# Patient Record
Sex: MALE | URBAN - METROPOLITAN AREA
[De-identification: names, ages, dates, MRNs, and addresses within clinical notes are randomized per-mention and may not be internally consistent; named-entity substitution may affect disease eponyms.]

---

## 2018-09-26 ENCOUNTER — PROCEDURE VISIT (OUTPATIENT)
Dept: SPORTS MEDICINE | Age: 23
End: 2018-09-26

## 2018-09-26 DIAGNOSIS — M75.22 BICEPS TENDINITIS OF LEFT SHOULDER: Primary | ICD-10-CM

## 2018-09-26 ASSESSMENT — PAIN SCALES - GENERAL: PAINLEVEL_OUTOF10: 6

## 2018-10-09 ENCOUNTER — PROCEDURE VISIT (OUTPATIENT)
Dept: SPORTS MEDICINE | Age: 23
End: 2018-10-09

## 2018-10-09 DIAGNOSIS — M75.22 BICEPS TENDINITIS OF LEFT SHOULDER: Primary | ICD-10-CM

## 2018-10-16 ENCOUNTER — PROCEDURE VISIT (OUTPATIENT)
Dept: SPORTS MEDICINE | Age: 23
End: 2018-10-16

## 2018-10-16 DIAGNOSIS — M75.22 BICEPS TENDINITIS OF LEFT SHOULDER: Primary | ICD-10-CM

## 2018-10-17 ENCOUNTER — PROCEDURE VISIT (OUTPATIENT)
Dept: SPORTS MEDICINE | Age: 23
End: 2018-10-17

## 2018-10-17 DIAGNOSIS — M75.22 BICEPS TENDINITIS OF LEFT SHOULDER: Primary | ICD-10-CM

## 2018-10-22 ENCOUNTER — PROCEDURE VISIT (OUTPATIENT)
Dept: SPORTS MEDICINE | Age: 23
End: 2018-10-22

## 2018-10-22 DIAGNOSIS — S86.311A STRAIN OF PERONEAL TENDON OF RIGHT FOOT, INITIAL ENCOUNTER: Primary | ICD-10-CM

## 2018-10-22 ASSESSMENT — PAIN SCALES - GENERAL: PAINLEVEL_OUTOF10: 4

## 2018-10-31 ENCOUNTER — PROCEDURE VISIT (OUTPATIENT)
Dept: SPORTS MEDICINE | Age: 23
End: 2018-10-31

## 2018-10-31 DIAGNOSIS — S86.311A STRAIN OF PERONEAL TENDON OF RIGHT FOOT, INITIAL ENCOUNTER: ICD-10-CM

## 2018-10-31 DIAGNOSIS — M75.22 BICEPS TENDINITIS OF LEFT SHOULDER: Primary | ICD-10-CM

## 2018-11-13 ENCOUNTER — PROCEDURE VISIT (OUTPATIENT)
Dept: SPORTS MEDICINE | Age: 23
End: 2018-11-13

## 2018-11-13 DIAGNOSIS — M75.22 BICEPS TENDINITIS OF LEFT SHOULDER: Primary | ICD-10-CM

## 2018-11-15 ENCOUNTER — PROCEDURE VISIT (OUTPATIENT)
Dept: SPORTS MEDICINE | Age: 23
End: 2018-11-15

## 2018-11-15 DIAGNOSIS — M75.22 BICEPS TENDINITIS OF LEFT SHOULDER: Primary | ICD-10-CM

## 2018-11-30 ENCOUNTER — PROCEDURE VISIT (OUTPATIENT)
Dept: SPORTS MEDICINE | Age: 23
End: 2018-11-30

## 2018-11-30 DIAGNOSIS — M75.22 BICEPS TENDINITIS OF LEFT SHOULDER: Primary | ICD-10-CM

## 2023-02-17 ENCOUNTER — NURSE TRIAGE (OUTPATIENT)
Dept: OTHER | Facility: CLINIC | Age: 28
End: 2023-02-17

## 2023-02-17 NOTE — TELEPHONE ENCOUNTER
LM on VM - Made pt aware our office is not accepting new patients at this time, offered a few other offices to call.

## 2023-02-17 NOTE — TELEPHONE ENCOUNTER
Location of patient: Izabela Angela call from Sridhar at [employer] with [employer]. 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 Patient/Caller agrees with recommended disposition; writer provided warm transfer to [employer] [facility] [employer] for appointment scheduling    Attention Provider: Thank you for allowing me to participate in the care of your patient. The patient was connected to triage in response to information provided to the ECC/PSC. Please do not respond through this encounter as the response is not directed to a shared pool.      Reason for Disposition   Numbness or tingling in one or both hands is a chronic symptom (recurrent or ongoing problem lasting > 4 weeks)    Protocols used: Neurologic Deficit-ADULT-[place]

## 2023-02-18 ENCOUNTER — APPOINTMENT (OUTPATIENT)
Dept: GENERAL RADIOLOGY | Age: 28
End: 2023-02-18
Payer: COMMERCIAL

## 2023-02-18 ENCOUNTER — HOSPITAL ENCOUNTER (EMERGENCY)
Age: 28
Discharge: HOME | End: 2023-02-18
Payer: COMMERCIAL

## 2023-02-18 VITALS
HEART RATE: 87 BPM | OXYGEN SATURATION: 100 % | RESPIRATION RATE: 16 BRPM | SYSTOLIC BLOOD PRESSURE: 128 MMHG | BODY MASS INDEX: 26.48 KG/M2 | DIASTOLIC BLOOD PRESSURE: 75 MMHG | WEIGHT: 185 LBS | TEMPERATURE: 97.9 F | HEIGHT: 70 IN

## 2023-02-18 DIAGNOSIS — R09.82 POST-NASAL DRAINAGE: ICD-10-CM

## 2023-02-18 DIAGNOSIS — R06.02 SHORTNESS OF BREATH: Primary | ICD-10-CM

## 2023-02-18 DIAGNOSIS — J02.9 ACUTE PHARYNGITIS, UNSPECIFIED ETIOLOGY: ICD-10-CM

## 2023-02-18 LAB
ALBUMIN SERPL-MCNC: 4.8 G/DL (ref 3.5–5.2)
ALP BLD-CCNC: 87 U/L (ref 40–129)
ALT SERPL-CCNC: 31 U/L (ref 0–40)
ANION GAP SERPL CALCULATED.3IONS-SCNC: 12 MMOL/L (ref 7–16)
AST SERPL-CCNC: 158 U/L (ref 0–39)
BASOPHILS ABSOLUTE: 0.03 E9/L (ref 0–0.2)
BASOPHILS RELATIVE PERCENT: 0.4 % (ref 0–2)
BILIRUB SERPL-MCNC: 0.4 MG/DL (ref 0–1.2)
BUN BLDV-MCNC: 22 MG/DL (ref 6–20)
CALCIUM SERPL-MCNC: 9.4 MG/DL (ref 8.6–10.2)
CHLORIDE BLD-SCNC: 102 MMOL/L (ref 98–107)
CO2: 26 MMOL/L (ref 22–29)
CREAT SERPL-MCNC: 1 MG/DL (ref 0.7–1.2)
EOSINOPHILS ABSOLUTE: 0.13 E9/L (ref 0.05–0.5)
EOSINOPHILS RELATIVE PERCENT: 1.7 % (ref 0–6)
GFR SERPL CREATININE-BSD FRML MDRD: >60 ML/MIN/1.73
GLUCOSE BLD-MCNC: 97 MG/DL (ref 74–99)
HCT VFR BLD CALC: 42.1 % (ref 37–54)
HEMOGLOBIN: 14.4 G/DL (ref 12.5–16.5)
IMMATURE GRANULOCYTES #: 0.01 E9/L
IMMATURE GRANULOCYTES %: 0.1 % (ref 0–5)
INFLUENZA A BY PCR: NOT DETECTED
INFLUENZA B BY PCR: NOT DETECTED
LYMPHOCYTES ABSOLUTE: 2.35 E9/L (ref 1.5–4)
LYMPHOCYTES RELATIVE PERCENT: 30.7 % (ref 20–42)
MCH RBC QN AUTO: 30.1 PG (ref 26–35)
MCHC RBC AUTO-ENTMCNC: 34.2 % (ref 32–34.5)
MCV RBC AUTO: 87.9 FL (ref 80–99.9)
MONOCYTES ABSOLUTE: 0.76 E9/L (ref 0.1–0.95)
MONOCYTES RELATIVE PERCENT: 9.9 % (ref 2–12)
NEUTROPHILS ABSOLUTE: 4.37 E9/L (ref 1.8–7.3)
NEUTROPHILS RELATIVE PERCENT: 57.2 % (ref 43–80)
PDW BLD-RTO: 12.8 FL (ref 11.5–15)
PLATELET # BLD: 256 E9/L (ref 130–450)
PMV BLD AUTO: 9.5 FL (ref 7–12)
POTASSIUM REFLEX MAGNESIUM: 4.1 MMOL/L (ref 3.5–5)
PRO-BNP: <5 PG/ML (ref 0–125)
RBC # BLD: 4.79 E12/L (ref 3.8–5.8)
SODIUM BLD-SCNC: 140 MMOL/L (ref 132–146)
TOTAL PROTEIN: 8 G/DL (ref 6.4–8.3)
TROPONIN, HIGH SENSITIVITY: 7 NG/L (ref 0–11)
WBC # BLD: 7.7 E9/L (ref 4.5–11.5)

## 2023-02-18 PROCEDURE — 93005 ELECTROCARDIOGRAM TRACING: CPT | Performed by: PHYSICIAN ASSISTANT

## 2023-02-18 PROCEDURE — 84484 ASSAY OF TROPONIN QUANT: CPT

## 2023-02-18 PROCEDURE — 85025 COMPLETE CBC W/AUTO DIFF WBC: CPT

## 2023-02-18 PROCEDURE — 99285 EMERGENCY DEPT VISIT HI MDM: CPT

## 2023-02-18 PROCEDURE — 83880 ASSAY OF NATRIURETIC PEPTIDE: CPT

## 2023-02-18 PROCEDURE — 71046 X-RAY EXAM CHEST 2 VIEWS: CPT

## 2023-02-18 PROCEDURE — 80053 COMPREHEN METABOLIC PANEL: CPT

## 2023-02-18 PROCEDURE — 87502 INFLUENZA DNA AMP PROBE: CPT

## 2023-02-18 RX ORDER — ALBUTEROL SULFATE 90 UG/1
2 AEROSOL, METERED RESPIRATORY (INHALATION) EVERY 6 HOURS PRN
Qty: 18 G | Refills: 0 | Status: SHIPPED | OUTPATIENT
Start: 2023-02-18

## 2023-02-18 RX ORDER — CETIRIZINE HYDROCHLORIDE 10 MG/1
10 TABLET ORAL DAILY
Qty: 30 TABLET | Refills: 0 | Status: SHIPPED | OUTPATIENT
Start: 2023-02-18 | End: 2023-03-20

## 2023-02-18 ASSESSMENT — PAIN - FUNCTIONAL ASSESSMENT: PAIN_FUNCTIONAL_ASSESSMENT: NONE - DENIES PAIN

## 2023-02-18 NOTE — ED PROVIDER NOTES
Independent [facility] Visit. Department of Emergency Medicine   ED  Provider Note  Admit Date/Time: 2/18/2023  6:45 PM  ED Bed: 32/32     MRN: [id number]  CHIEF COMPLAINT:   Shortness of Breath (JONES, worse when sitting, 99% on RA / started yesterday) and Pharyngitis (Rapid strep done today at urgent care negative )       HISTORY OF PRESENT ILLNESS:      Yovanny Martinez is a 24 y.o. male who presents to the ED for shortness of breath that began yesterday. Patient states he gets dyspnea on exertion. He states his shortness of breath is also worse when he is sitting. He also reports sore throat. Patient was sent in by urgent care for further evaluation due to his complaints of shortness of breath and chest discomfort. Patient had negative COVID and negative strep test done at urgent care prior to arrival.  He denies any past history of heart or lung problems. He has no headache, dizziness, fever/chills, body aches, abdominal pain, nausea, vomiting, diarrhea, or recent travel. He is alert and oriented x3 and in no apparent distress at this exam.  Patient is nontoxic-appearing. He denies any known sick contacts. PCP: No primary care provider on file. PERC Rule for PE:    Age ? 50 negative     HR ? 100 negative     O2 Sat on Room Air < 95% negative     Prior History of DVT/PE negative     Recent Trauma or Surgery negative     Hemoptysis negative     Exogenous Estrogen/Hormone Use negative     Unilateral Extremity Swelling negative     REVIEW OF SYSTEMS:   Pertinent positives and negatives are stated within HPI, all other systems reviewed and are negative. Past Medical History: No past medical history on file. Past Surgical History: No past surgical history on file. Social History:      Family History: family history is not on file. Allergies: Patient has no known allergies.     PHYSICAL EXAM:     Vitals:    02/18/23 1749 02/18/23 1751   BP:  128/75   Pulse: 87    Resp: 16    Temp: 97.9 °F (36.6 °C) TempSrc: Oral    SpO2: 100%    Weight: 185 lb (83.9 kg)    Height: 5' 10\" (1.778 m)    Oxygen Saturation Interpretation: Normal    Physical Exam   Constitutional/General: Alert and oriented x4, well appearing, non toxic in NAD  HEENT:  NC/NT. EOMI, Airway patent, No pharyngeal erythema  Neck: Supple, full ROM, non tender with no meningeal signs, no lymphadenopathy   Respiratory: Lungs clear to auscultation bilaterally with good air flow, Not in respiratory distress, 100% room air   CV:  Regular rate. Regular rhythm. GI:  Abdomen soft, Non tender, Non distended. No rebound, guarding, or rigidity. No pulsatile masses. Musculoskeletal: Moves all extremities x 4. Warm and well perfused, no edema. Integument: Skin warm and dry. No rashes.    Neurologic: GCS 15, no focal deficits, symmetric strength 5/5 in the upper and lower extremities bilaterally, CN II-XII grossly intact    PROCEDURES:   None     LAB/IMAGING RESULTS:   (All laboratory and radiology results have been personally reviewed by myself)  Labs:  Results for orders placed or performed during the hospital encounter of 02/18/23   RAPID INFLUENZA A/B ANTIGENS    Specimen: Nasopharyngeal   Result Value Ref Range    Influenza A by PCR Not Detected Not Detected    Influenza B by PCR Not Detected Not Detected   CBC with Auto Differential   Result Value Ref Range    WBC 7.7 4.5 - 11.5 E9/L    RBC 4.79 3.80 - 5.80 E12/L    Hemoglobin 14.4 12.5 - 16.5 g/dL    Hematocrit 42.1 37.0 - 54.0 %    MCV 87.9 80.0 - 99.9 fL    MCH 30.1 26.0 - 35.0 pg    MCHC 34.2 32.0 - 34.5 %    RDW 12.8 11.5 - 15.0 fL    Platelets 367 973 - 376 E9/L    MPV 9.5 7.0 - 12.0 fL    Neutrophils % 57.2 43.0 - 80.0 %    Immature Granulocytes % 0.1 0.0 - 5.0 %    Lymphocytes % 30.7 20.0 - 42.0 %    Monocytes % 9.9 2.0 - 12.0 %    Eosinophils % 1.7 0.0 - 6.0 %    Basophils % 0.4 0.0 - 2.0 %    Neutrophils Absolute 4.37 1.80 - 7.30 E9/L    Immature Granulocytes # 0.01 E9/L    Lymphocytes Absolute 2. 35 1.50 - 4.00 E9/L    Monocytes Absolute 0.76 0.10 - 0.95 E9/L    Eosinophils Absolute 0.13 0.05 - 0.50 E9/L    Basophils Absolute 0.03 0.00 - 0.20 E9/L   Comprehensive Metabolic Panel w/ Reflex to MG   Result Value Ref Range    Sodium 140 132 - 146 mmol/L    Potassium reflex Magnesium 4.1 3.5 - 5.0 mmol/L    Chloride 102 98 - 107 mmol/L    CO2 26 22 - 29 mmol/L    Anion Gap 12 7 - 16 mmol/L    Glucose 97 74 - 99 mg/dL    BUN 22 (H) 6 - 20 mg/dL    Creatinine 1.0 0.7 - 1.2 mg/dL    Est, Glom Filt Rate >60 >=60 mL/min/1.73    Calcium 9.4 8.6 - 10.2 mg/dL    Total Protein 8.0 6.4 - 8.3 g/dL    Albumin 4.8 3.5 - 5.2 g/dL    Total Bilirubin 0.4 0.0 - 1.2 mg/dL    Alkaline Phosphatase 87 40 - 129 U/L    ALT 31 0 - 40 U/L     (H) 0 - 39 U/L   Troponin   Result Value Ref Range    Troponin, High Sensitivity 7 0 - 11 ng/L   Brain Natriuretic Peptide   Result Value Ref Range    Pro-BNP <5 0 - 125 pg/mL   EKG 12 Lead   Result Value Ref Range    Ventricular Rate 67 BPM    Atrial Rate 67 BPM    P-R Interval 152 ms    QRS Duration 94 ms    Q-T Interval 400 ms    QTc Calculation (Bazett) 422 ms    P Axis 50 degrees    R Axis 43 degrees    T Axis 25 degrees     Imaging: All Radiology results interpreted by Radiologist unless otherwise noted. XR CHEST (2 VW)   Final Result   No acute cardiopulmonary process. EKG #1:  Interpreted by emergency department physician unless otherwise noted. Time:  1910    Rate: 67  QT/QTc: 400/422  Rhythm: Sinus. Interpretation: no acute changes. Comparison: no previous EKG. Confirmed by ED attending     -- MEDICAL DECISION MAKING --   History From: History from : Patient  Limitations to history : None    Record Review:  Outpatient Notes reviewed  Other Records Reviewed :  Outpatient Notes Urgent Care    CC/HPI Summary, DDx, ED Course, and Reassessment:   Patient presents to the ED for Shortness of Breath (JONES, worse when sitting, 99% on RA / started yesterday) and Pharyngitis (Rapid strep done today at urgent care negative )    This is a 57-year-old male who presents with shortness of breath that began yesterday. He also reports sore throat. He had negative strep and COVID test done at urgent care prior to arrival.  He was sent in for further evaluation. X-ray reveals no effusions or pneumonia, influenza result negative  Patient will be sent home with albuterol inhaler to use as needed as well as antihistamine to help with postnasal drip. Patient states he has an appointment with his family doctor on Monday and advised to keep that for follow-up appointment    Patient was given the following medications:  Medications - No data to display     Differential diagnoses included but not limited to URI, asthma, bronchitis    Reassessment:  Patient continues to be non-toxic on re-evaluation. Chronic Conditions:   No past medical history on file. ED COURSE:      Any labs and imaging were reviewed by myself. Consultations:  None  Discussion with Other Profesionals : None    COUNSELING:   I have spoken with the patient/caregiver and discussed todays results, in addition to providing specific details for the plan of care and counseling regarding the diagnosis and prognosis and are agreeable with the plan. All results reviewed with pt and all questions answered. I discussed the differential, results and discharge plan with the patient and/or family/friend/caregiver if present. I emphasized the importance of follow-up with the physician I referred them to in the timeframe recommended. I explained reasons for the patient to return to the Emergency Department. Additional verbal discharge instructions were also given and discussed with the patient to supplement those generated by the EMR. We also discussed medications that were prescribed (if any) including common side effects and interactions. All questions were addressed.   They understand return precautions and discharge instructions. The patient and/or family/friend/caregiver expressed understanding. Vitals were stable and they were in no distress at discharge. Findings were discussed with the patient and reasons to immediately return to the ED were articulated to them. Patient will follow-up with their PMD    DISPOSITION CONSIDERATIONS:    Disposition Considerations:  (include Tests not done, Shared Decision Making, Pt Expectation of Test or Tx.):   Appropriate for outpatient management        Social Determinants:  Social Determinants : None    MEDICATIONS:   DISCHARGE MEDICATIONS:  Discharge Medication List as of 2/18/2023  9:50 PM        START taking these medications    Details   albuterol sulfate HFA (PROAIR HFA) 108 (90 Base) MCG/ACT inhaler Inhale 2 puffs into the lungs every 6 hours as needed for Shortness of Breath, Disp-18 g, R-0Normal      cetirizine (ZYRTEC) 10 MG tablet Take 1 tablet by mouth daily, Disp-30 tablet, R-0Normal           DISCONTINUED MEDICATIONS:  Discharge Medication List as of 2/18/2023  9:50 PM        DIAGNOSIS:     1. Shortness of breath    2. Acute pharyngitis, unspecified etiology    3. Post-nasal drainage      This patient's ED course included: a personal history and physicial examination  This patient has remained hemodynamically stable during their ED course. DISPOSITION:   Discharge to home. Patient condition is good. Discharge Instructions:   Patient referred to  [facility], [address] [address]  [phone]    Go to   for already scheduled appointment on Monday    Electronically signed by Kathya Herrera PA-C   DD: 2/18/23  I am the Primary Clinician of Record. **This report was transcribed using voice recognition software. Every effort was made to ensure accuracy; however, inadvertent computerized transcription errors may be present.     END OF PROVIDER NOTE       Kathya Herrera PA-C  02/18/23 0081

## 2023-02-19 LAB
EKG ATRIAL RATE: 67 BPM
EKG P AXIS: 50 DEGREES
EKG P-R INTERVAL: 152 MS
EKG Q-T INTERVAL: 400 MS
EKG QRS DURATION: 94 MS
EKG QTC CALCULATION (BAZETT): 422 MS
EKG R AXIS: 43 DEGREES
EKG T AXIS: 25 DEGREES
EKG VENTRICULAR RATE: 67 BPM

## 2023-02-19 PROCEDURE — 93010 ELECTROCARDIOGRAM REPORT: CPT | Performed by: INTERNAL MEDICINE

## 2023-03-03 ENCOUNTER — OFFICE VISIT (OUTPATIENT)
Dept: FAMILY MEDICINE CLINIC | Age: 28
End: 2023-03-03
Payer: COMMERCIAL

## 2023-03-03 VITALS
RESPIRATION RATE: 20 BRPM | HEART RATE: 60 BPM | BODY MASS INDEX: 27.86 KG/M2 | TEMPERATURE: 97.6 F | WEIGHT: 194.6 LBS | OXYGEN SATURATION: 97 % | HEIGHT: 70 IN | SYSTOLIC BLOOD PRESSURE: 118 MMHG | DIASTOLIC BLOOD PRESSURE: 69 MMHG

## 2023-03-03 DIAGNOSIS — R74.01 TRANSAMINITIS: ICD-10-CM

## 2023-03-03 DIAGNOSIS — R20.0 NUMBNESS: Primary | ICD-10-CM

## 2023-03-03 PROCEDURE — 99203 OFFICE O/P NEW LOW 30 MIN: CPT | Performed by: STUDENT IN AN ORGANIZED HEALTH CARE EDUCATION/TRAINING PROGRAM

## 2023-03-03 SDOH — SOCIAL STABILITY: SOCIAL INSECURITY: WITHIN THE LAST YEAR, HAVE YOU BEEN AFRAID OF YOUR PARTNER OR EX-PARTNER?: NO

## 2023-03-03 SDOH — HEALTH STABILITY: MENTAL HEALTH: HOW MANY STANDARD DRINKS CONTAINING ALCOHOL DO YOU HAVE ON A TYPICAL DAY?: 3 OR 4

## 2023-03-03 SDOH — HEALTH STABILITY: MENTAL HEALTH: HOW OFTEN DO YOU HAVE A DRINK CONTAINING ALCOHOL?: 2-4 TIMES A MONTH

## 2023-03-03 SDOH — SOCIAL STABILITY: SOCIAL NETWORK: HOW OFTEN DO YOU ATTEND CHURCH OR RELIGIOUS SERVICES?: NEVER

## 2023-03-03 SDOH — SOCIAL STABILITY: SOCIAL INSECURITY: WITHIN THE LAST YEAR, HAVE YOU BEEN HUMILIATED OR EMOTIONALLY ABUSED IN OTHER WAYS BY YOUR PARTNER OR EX-PARTNER?: NO

## 2023-03-03 SDOH — ECONOMIC STABILITY: FOOD INSECURITY: WITHIN THE PAST 12 MONTHS, THE FOOD YOU BOUGHT JUST DIDN'T LAST AND YOU DIDN'T HAVE MONEY TO GET MORE.: NEVER TRUE

## 2023-03-03 SDOH — SOCIAL STABILITY: SOCIAL NETWORK: HOW OFTEN DO YOU ATTENT MEETINGS OF THE CLUB OR ORGANIZATION YOU BELONG TO?: NEVER

## 2023-03-03 SDOH — SOCIAL STABILITY: SOCIAL NETWORK: ARE YOU MARRIED, WIDOWED, DIVORCED, SEPARATED, NEVER MARRIED, OR LIVING WITH A PARTNER?: NEVER MARRIED

## 2023-03-03 SDOH — HEALTH STABILITY: PHYSICAL HEALTH: ON AVERAGE, HOW MANY DAYS PER WEEK DO YOU ENGAGE IN MODERATE TO STRENUOUS EXERCISE (LIKE A BRISK WALK)?: 6 DAYS

## 2023-03-03 SDOH — ECONOMIC STABILITY: HOUSING INSECURITY: IN THE LAST 12 MONTHS, HOW MANY PLACES HAVE YOU LIVED?: 0

## 2023-03-03 SDOH — HEALTH STABILITY: PHYSICAL HEALTH: ON AVERAGE, HOW MANY MINUTES DO YOU ENGAGE IN EXERCISE AT THIS LEVEL?: 60 MIN

## 2023-03-03 SDOH — ECONOMIC STABILITY: TRANSPORTATION INSECURITY
IN THE PAST 12 MONTHS, HAS THE LACK OF TRANSPORTATION KEPT YOU FROM MEDICAL APPOINTMENTS OR FROM GETTING MEDICATIONS?: NO

## 2023-03-03 SDOH — SOCIAL STABILITY: SOCIAL NETWORK: HOW OFTEN DO YOU GET TOGETHER WITH FRIENDS OR RELATIVES?: ONCE A WEEK

## 2023-03-03 SDOH — ECONOMIC STABILITY: HOUSING INSECURITY
IN THE LAST 12 MONTHS, WAS THERE A TIME WHEN YOU DID NOT HAVE A STEADY PLACE TO SLEEP OR SLEPT IN A SHELTER (INCLUDING NOW)?: NO

## 2023-03-03 SDOH — ECONOMIC STABILITY: FOOD INSECURITY: WITHIN THE PAST 12 MONTHS, YOU WORRIED THAT YOUR FOOD WOULD RUN OUT BEFORE YOU GOT MONEY TO BUY MORE.: NEVER TRUE

## 2023-03-03 SDOH — ECONOMIC STABILITY: INCOME INSECURITY: IN THE LAST 12 MONTHS, WAS THERE A TIME WHEN YOU WERE NOT ABLE TO PAY THE MORTGAGE OR RENT ON TIME?: NO

## 2023-03-03 SDOH — ECONOMIC STABILITY: INCOME INSECURITY: HOW HARD IS IT FOR YOU TO PAY FOR THE VERY BASICS LIKE FOOD, HOUSING, MEDICAL CARE, AND HEATING?: NOT HARD AT ALL

## 2023-03-03 SDOH — SOCIAL STABILITY: SOCIAL NETWORK
DO YOU BELONG TO ANY CLUBS OR ORGANIZATIONS SUCH AS CHURCH GROUPS UNIONS, FRATERNAL OR ATHLETIC GROUPS, OR SCHOOL GROUPS?: NO

## 2023-03-03 SDOH — SOCIAL STABILITY: SOCIAL INSECURITY
WITHIN THE LAST YEAR, HAVE TO BEEN RAPED OR FORCED TO HAVE ANY KIND OF SEXUAL ACTIVITY BY YOUR PARTNER OR EX-PARTNER?: NO

## 2023-03-03 SDOH — SOCIAL STABILITY: SOCIAL INSECURITY
WITHIN THE LAST YEAR, HAVE YOU BEEN KICKED, HIT, SLAPPED, OR OTHERWISE PHYSICALLY HURT BY YOUR PARTNER OR EX-PARTNER?: NO

## 2023-03-03 SDOH — SOCIAL STABILITY: SOCIAL NETWORK
IN A TYPICAL WEEK, HOW MANY TIMES DO YOU TALK ON THE PHONE WITH FAMILY, FRIENDS, OR NEIGHBORS?: MORE THAN THREE TIMES A WEEK

## 2023-03-03 SDOH — HEALTH STABILITY: MENTAL HEALTH
STRESS IS WHEN SOMEONE FEELS TENSE, NERVOUS, ANXIOUS, OR CAN'T SLEEP AT NIGHT BECAUSE THEIR MIND IS TROUBLED. HOW STRESSED ARE YOU?: TO SOME EXTENT

## 2023-03-03 SDOH — ECONOMIC STABILITY: TRANSPORTATION INSECURITY
IN THE PAST 12 MONTHS, HAS LACK OF TRANSPORTATION KEPT YOU FROM MEETINGS, WORK, OR FROM GETTING THINGS NEEDED FOR DAILY LIVING?: NO

## 2023-03-03 ASSESSMENT — PATIENT HEALTH QUESTIONNAIRE - PHQ9
SUM OF ALL RESPONSES TO PHQ QUESTIONS 1-9: 2
SUM OF ALL RESPONSES TO PHQ QUESTIONS 1-9: 2
SUM OF ALL RESPONSES TO PHQ9 QUESTIONS 1 & 2: 2
1. LITTLE INTEREST OR PLEASURE IN DOING THINGS: 1
2. FEELING DOWN, DEPRESSED OR HOPELESS: 1
SUM OF ALL RESPONSES TO PHQ QUESTIONS 1-9: 2
SUM OF ALL RESPONSES TO PHQ QUESTIONS 1-9: 2

## 2023-03-03 NOTE — PROGRESS NOTES
Patient:  Lily Camargo 24 y.o. male     03/03/23      Chiefcomplaint:   Chief Complaint   Patient presents with    Establish Care     Problems and Overview     Patient Active Problem List    Diagnosis Date Noted    Transaminitis 03/03/2023     Priority: Medium    Numbness 03/03/2023     Priority: Medium        New pt    Concern today is numbness in ulnar distribution. Had episode of jaw pain and shortly after developed numbness in fifth and part of 4th digit. It has remained numb. Exercises with repetitive flexion of wrist caused wrist pain only on the left but didn't necessarily impact numbness, otherwise there are no specific things that make it worse  He does not have neck pain or elbow pain. He does box. Had labs for respiratory illness and showed elevated AST. Uncertain etiology. No previous comparison. Didn't need inhaler prior to that episode but does have seasonal allergies    Prevention:  Health Maintenance Due   Topic Date Due    COVID-19 Vaccine (1) Never done    Varicella vaccine (1 of 2 - 2-dose childhood series) Never done    HPV vaccine (1 - Male 2-dose series) Never done    Depression Screen  Never done    HIV screen  Never done    Hepatitis C screen  Never done    Flu vaccine (1) 08/01/2022      Meds prior:  Current Outpatient Medications   Medication Instructions    albuterol sulfate HFA (PROAIR HFA) 108 (90 Base) MCG/ACT inhaler 2 puffs, Inhalation, EVERY 6 HOURS PRN    cetirizine (ZYRTEC) 10 mg, Oral, DAILY    lisdexamfetamine (VYVANSE) 30 mg, Oral, EVERY MORNING      Physical Exam   Vitals: /69   Pulse 60   Temp 97.6 °F (36.4 °C) (Temporal)   Resp 20   Ht 5' 10\" (1.778 m)   Wt 194 lb 9.6 oz (88.3 kg)   SpO2 97%   BMI 27.92 kg/m²   General Appearance: Alert, oriented, no acute distress  HEENT: No scleral icterus. No visible discharge from eyes  Neck: Not rigid. No visible masses  Chest wall/Lung:resp unlabored  Heart: Reg rate  Extremities:  No edema.  Numbness ulnar distribution. No weakness or loss of dexterity  Skin: No rashes. No jaundice  Neuro: Alert and oriented        Psych: Appropriate mood and appropriate affect  Assessment and Plan   Check emg for ulnar neuropathy. Consider fu with hand surgeon depending on results.   Recheck labs for transaminitis. No abd pain.    Numbness  -     EMG; Future  Transaminitis  -     Comprehensive Metabolic Panel; Future    No follow-ups on file.    Brad Sampson,      This document may have been prepared at least partially through the use of voice recognition software. Although effort is taken to assure the accuracy of this document, it is possible that grammatical, syntax,  or spelling errors may occur.

## 2023-03-09 ENCOUNTER — PATIENT MESSAGE (OUTPATIENT)
Dept: FAMILY MEDICINE CLINIC | Age: 28
End: 2023-03-09

## 2023-03-10 NOTE — TELEPHONE ENCOUNTER
From: Nhan Kennedy  To: Dr. Brad Sampson  Sent: 3/9/2023 7:09 PM EST  Subject: New appointment for testing     Hello,    I was told I would be receiving a phone call to set up an appointment for the tests you want me to take. I haven’t received any yet. I wasn’t sure if I should have by now but wanted to bring it to your attention. Thank you!

## 2023-03-22 ENCOUNTER — PROCEDURE VISIT (OUTPATIENT)
Dept: PHYSICAL MEDICINE AND REHAB | Age: 28
End: 2023-03-22

## 2023-03-22 VITALS — BODY MASS INDEX: 26.48 KG/M2 | WEIGHT: 185 LBS | HEIGHT: 70 IN

## 2023-03-22 DIAGNOSIS — G56.20 CUBITAL TUNNEL SYNDROME, UNSPECIFIED LATERALITY: Primary | ICD-10-CM

## 2023-03-22 DIAGNOSIS — R20.0 NUMBNESS: ICD-10-CM

## 2023-03-22 NOTE — PROGRESS NOTES
Electrodiagnostic Laboratory  *Accredited by the [facility] with exemplary status  [address] [facility] [place]  Phone: [phone]  Fax: [phone]      Date of Examination: 03/22/23    Patient Name: Tasha Stallworth    An independent historian was not needed. Tasha Stallworth  is a 24y.o. year old male who was seen today regarding   Chief Complaint   Patient presents with    Extremity Pain     none    Numbness     Numbness/tingling in the left pinky and half of ring finger. 3 months of symp. No acute injury. Extremity Weakness     none   . The symptoms started after no particular injury. Reports he first noted symptoms after completing a wrist workout. The symptoms are intermittent. I have reviewed the referring provider's office note. There is not a family history of neuromuscular conditions. Physical Exam: General: The patient is in no apparent distress. MSK: There is no joint effusion, deformity, instability, swelling, erythema or warmth. AROM is full in the spine and extremities. +Tinel left elbow, negative left wrist. Spurling is negative. Neurologic:  Diminished light touch left fourth and fifth digit, otherwise, no focal sensorimotor deficit. Reflexes 2+ and symmetric. Gait is normal.    Impression:     1. Numbness        Plan:   EMG is indicated to evaluate the above diagnosis. 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 Recommend surgical consult regarding cubital tunnel given extent of axonal loss.  In interim can consider OT, elbow splint at hs, elbow pad during waking
prognosis for recovery of demyelinating lesions is good if the cause is alleviated. The prognosis for recovery of axonal lesions is poor and dependant on collateral sprouting and reinnervation. Electrodiagnosis: There is electrodiagnostic evidence of a median mononeuropathy. Location: left at the wrist.   Rossi Moose: [  ] Axonal   [ X ] Demyelinating  [  ] Mixed axonal and demyelinating     [ X ] Sensory [  ] Motor               [  ] Mixed sensorimotor     [  ] with active denervation       [ X ] without active denervation  Duration: Acute  Severity: mild  Prognosis: Good    Previous Study: There is not a prior study for comparison. Follow up EMG is recommended if no surgical intervention and symptoms persist in 6 months. Technologist: Shoaibrolando Colin  Physician:    Kaur Carlos D.O., P.T. Board Certified Physical Medicine and Rehabilitation  Board Certified Electrodiagnostic Medicine      Nerve conduction studies and electromyography were performed according to our laboratory policies and procedures which can be provided upon request. All abnormal values are identified in the table.  Laboratory normal values can also be provided upon request.       Cc: DO Jude Rossi DO

## 2023-03-22 NOTE — PATIENT INSTRUCTIONS
Electrodiagnotic Laboratory  Accredited by the [facility] with Exemplary status  CINDI Carter D.O. [facility]  [address] [facility] [place]  Phone: [phone]  Fax: [phone]        Today you had an electrodiagnostic exam which included nerve conduction studies (NCS) and electromyography (EMG). This test evaluated the electrical activity of your nerves and muscles to help determine if you have a nerve or muscle disease. This test can help determine the location and type of a nerve or muscle problem. This will help your referring doctor diagnose your condition and determine the appropriate next step in your treatment plan. After your test:    1. There are no long lasting side effects of the test.     2. You may resume your normal activities without restrictions. 3.  Resume any medications that were stopped for the test.     4  If you have sore areas or bruising in your muscles where the needle was placed, apply a cold pack to the sore area for 15-20 minutes three to four times a day as needed for pain. The soreness should go away in about 1-2 days. 5. Your results were provided  Briefly at the end of your test and the final detailed report will be provided to your referring physician, and/or primary care physician and any other parties you requested within 1-2 days of the examination. You may wish to contact your referring provider after a few days to determine what they would like you to do next. 6.  Please call [phone] with any questions or concerns and if you develop increased body temperature/fever, swelling, tenderness, increased pain and/or drainage from the sites where the needle was placed. Thank you for choosing us for your health care needs.

## 2023-03-23 ENCOUNTER — TELEPHONE (OUTPATIENT)
Dept: FAMILY MEDICINE CLINIC | Age: 28
End: 2023-03-23

## 2023-03-23 NOTE — TELEPHONE ENCOUNTER
Called to give result emg  Referral placed for surgical consultation. Recommend avoiding repetitive flexion/extension (boxing in his case) until evaluation.    All questions answered

## 2023-03-28 ENCOUNTER — PATIENT MESSAGE (OUTPATIENT)
Dept: FAMILY MEDICINE CLINIC | Age: 28
End: 2023-03-28

## 2023-03-28 NOTE — TELEPHONE ENCOUNTER
From: Jeferson Madera  To: Dr. Kristina Cavazos  Sent: 3/28/2023 3:37 PM EDT  Subject: Phone call with Neurologist     Hello! Last time I spoke with Dr. Peace Larry he said that a neurologist should be contacting me soon to schedule an appointment. I havent heard from them yet. Any idea when this should be?

## 2023-11-21 ENCOUNTER — TELEPHONE (OUTPATIENT)
Dept: PRIMARY CARE CLINIC | Age: 28
End: 2023-11-21

## 2023-11-21 NOTE — TELEPHONE ENCOUNTER
Dr Janae Francois office called and wanting to see if you   would take linda on as a new patient. Contact patient with decision.      [phone]

## 2023-12-03 SDOH — HEALTH STABILITY: PHYSICAL HEALTH: ON AVERAGE, HOW MANY MINUTES DO YOU ENGAGE IN EXERCISE AT THIS LEVEL?: 50 MIN

## 2023-12-03 SDOH — HEALTH STABILITY: PHYSICAL HEALTH: ON AVERAGE, HOW MANY DAYS PER WEEK DO YOU ENGAGE IN MODERATE TO STRENUOUS EXERCISE (LIKE A BRISK WALK)?: 6 DAYS

## 2023-12-04 ENCOUNTER — OFFICE VISIT (OUTPATIENT)
Dept: PRIMARY CARE CLINIC | Age: 28
End: 2023-12-04
Payer: COMMERCIAL

## 2023-12-04 VITALS
DIASTOLIC BLOOD PRESSURE: 60 MMHG | TEMPERATURE: 97.6 F | OXYGEN SATURATION: 99 % | BODY MASS INDEX: 25.77 KG/M2 | HEART RATE: 63 BPM | WEIGHT: 180 LBS | SYSTOLIC BLOOD PRESSURE: 118 MMHG | HEIGHT: 70 IN

## 2023-12-04 DIAGNOSIS — J34.2 DEVIATED NASAL SEPTUM: ICD-10-CM

## 2023-12-04 DIAGNOSIS — F95.9 TIC DISORDER: ICD-10-CM

## 2023-12-04 DIAGNOSIS — J30.2 SEASONAL ALLERGIES: ICD-10-CM

## 2023-12-04 DIAGNOSIS — F98.8 ATTENTION DEFICIT DISORDER (ADD) WITHOUT HYPERACTIVITY: Primary | ICD-10-CM

## 2023-12-04 PROBLEM — R74.01 TRANSAMINITIS: Status: RESOLVED | Noted: 2023-03-03 | Resolved: 2023-12-04

## 2023-12-04 PROBLEM — R20.0 NUMBNESS: Status: RESOLVED | Noted: 2023-03-03 | Resolved: 2023-12-04

## 2023-12-04 LAB
ALBUMIN SERPL-MCNC: 4.3 G/DL (ref 3.5–5.2)
ALP BLD-CCNC: 90 U/L (ref 40–129)
ALT SERPL-CCNC: 7 U/L (ref 0–40)
ANION GAP SERPL CALCULATED.3IONS-SCNC: 16 MMOL/L (ref 7–16)
AST SERPL-CCNC: 21 U/L (ref 0–39)
BILIRUB SERPL-MCNC: 0.6 MG/DL (ref 0–1.2)
BUN BLDV-MCNC: 13 MG/DL (ref 6–20)
CALCIUM SERPL-MCNC: 9.1 MG/DL (ref 8.6–10.2)
CHLORIDE BLD-SCNC: 104 MMOL/L (ref 98–107)
CO2: 21 MMOL/L (ref 22–29)
CREAT SERPL-MCNC: 0.7 MG/DL (ref 0.7–1.2)
GFR SERPL CREATININE-BSD FRML MDRD: >60 ML/MIN/1.73M2
GLUCOSE BLD-MCNC: 91 MG/DL (ref 74–99)
HCT VFR BLD CALC: 42.4 % (ref 37–54)
HEMOGLOBIN: 14.2 G/DL (ref 12.5–16.5)
MCH RBC QN AUTO: 30.5 PG (ref 26–35)
MCHC RBC AUTO-ENTMCNC: 33.5 G/DL (ref 32–34.5)
MCV RBC AUTO: 91 FL (ref 80–99.9)
PDW BLD-RTO: 12.3 % (ref 11.5–15)
PLATELET # BLD: 217 K/UL (ref 130–450)
PMV BLD AUTO: 10.5 FL (ref 7–12)
POTASSIUM SERPL-SCNC: 4.8 MMOL/L (ref 3.5–5)
RBC # BLD: 4.66 M/UL (ref 3.8–5.8)
SODIUM BLD-SCNC: 141 MMOL/L (ref 132–146)
T4 FREE: 1.2 NG/DL (ref 0.9–1.7)
TOTAL PROTEIN: 6.8 G/DL (ref 6.4–8.3)
TSH SERPL DL<=0.05 MIU/L-ACNC: 1.6 UIU/ML (ref 0.27–4.2)
WBC # BLD: 5.5 K/UL (ref 4.5–11.5)

## 2023-12-04 PROCEDURE — 99203 OFFICE O/P NEW LOW 30 MIN: CPT | Performed by: FAMILY MEDICINE

## 2023-12-04 RX ORDER — ATOMOXETINE 25 MG/1
25 CAPSULE ORAL DAILY
Qty: 30 CAPSULE | Refills: 1 | Status: SHIPPED | OUTPATIENT
Start: 2023-12-04 | End: 2024-02-02

## 2023-12-04 ASSESSMENT — ENCOUNTER SYMPTOMS
APNEA: 0
SWOLLEN GLANDS: 0
EYE PAIN: 0
VISUAL CHANGE: 0
BACK PAIN: 0
WHEEZING: 0
EYE ITCHING: 0
SINUS PRESSURE: 0
CONSTIPATION: 0
CHEST TIGHTNESS: 0
VOMITING: 0
DIARRHEA: 0
ABDOMINAL PAIN: 0
SINUS COMPLAINT: 1
SORE THROAT: 0
EYE REDNESS: 0
RHINORRHEA: 0
BLOOD IN STOOL: 0
SHORTNESS OF BREATH: 0
COLOR CHANGE: 0
NAUSEA: 0
COUGH: 0
BOWEL INCONTINENCE: 0

## 2023-12-04 NOTE — PROGRESS NOTES
Chief Complaint:     Chief Complaint   Patient presents with    New Patient    Other     Ticks, sniffling and eye-rolling. Patient states it may be allergy-induced. ADHD medication worsened ticks    Neurologic Problem    Sinus Problem         Neurologic Problem  The patient's pertinent negatives include no clumsiness, focal sensory loss, focal weakness, memory loss, near-syncope, slurred speech, visual change or weakness. Primary symptoms comment: Tic disorder. The current episode started more than 1 year ago. The neurological problem developed gradually. The problem has been waxing and waning since onset. There was no focality noted. Pertinent negatives include no abdominal pain, aura, back pain, bladder incontinence, bowel incontinence, chest pain, dizziness, fatigue, fever, headaches, light-headedness, nausea, neck pain, palpitations, shortness of breath, vertigo or vomiting. Past treatments include nothing. The treatment provided no relief. Sinus Problem  This is a recurrent problem. The current episode started more than 1 month ago. The problem has been waxing and waning since onset. There has been no fever. Associated symptoms include congestion. Pertinent negatives include no coughing, ear pain, headaches, neck pain, shortness of breath, sinus pressure, sneezing, sore throat or swollen glands. Past treatments include nothing. The treatment provided no relief. Other  This is a chronic (ADD) problem. The current episode started more than 1 year ago. The problem occurs daily. The problem has been unchanged. Associated symptoms include congestion. Pertinent negatives include no abdominal pain, arthralgias, chest pain, coughing, fatigue, fever, headaches, myalgias, nausea, neck pain, numbness, rash, sore throat, swollen glands, vertigo, visual change, vomiting or weakness. Nothing aggravates the symptoms. He has tried nothing for the symptoms. The treatment provided no relief.        Patient Active Problem

## 2024-01-15 ENCOUNTER — OFFICE VISIT (OUTPATIENT)
Dept: PRIMARY CARE CLINIC | Age: 29
End: 2024-01-15
Payer: COMMERCIAL

## 2024-01-15 VITALS
HEART RATE: 74 BPM | WEIGHT: 185 LBS | DIASTOLIC BLOOD PRESSURE: 62 MMHG | OXYGEN SATURATION: 98 % | BODY MASS INDEX: 26.48 KG/M2 | HEIGHT: 70 IN | SYSTOLIC BLOOD PRESSURE: 110 MMHG | TEMPERATURE: 98.2 F

## 2024-01-15 DIAGNOSIS — F98.8 ATTENTION DEFICIT DISORDER (ADD) WITHOUT HYPERACTIVITY: Primary | ICD-10-CM

## 2024-01-15 DIAGNOSIS — F95.9 TIC DISORDER: ICD-10-CM

## 2024-01-15 PROCEDURE — 99213 OFFICE O/P EST LOW 20 MIN: CPT | Performed by: FAMILY MEDICINE

## 2024-01-15 ASSESSMENT — ENCOUNTER SYMPTOMS
CHEST TIGHTNESS: 0
COLOR CHANGE: 0
BACK PAIN: 0
NAUSEA: 0
EYE PAIN: 0
COUGH: 0
EYE ITCHING: 0
WHEEZING: 0
BOWEL INCONTINENCE: 0
APNEA: 0
SWOLLEN GLANDS: 0
SORE THROAT: 0
BLOOD IN STOOL: 0
VOMITING: 0
VISUAL CHANGE: 0
DIARRHEA: 0
SHORTNESS OF BREATH: 0
CONSTIPATION: 0
RHINORRHEA: 0
ABDOMINAL PAIN: 0
EYE REDNESS: 0
SINUS PRESSURE: 0

## 2024-01-15 ASSESSMENT — PATIENT HEALTH QUESTIONNAIRE - PHQ9
SUM OF ALL RESPONSES TO PHQ QUESTIONS 1-9: 0
SUM OF ALL RESPONSES TO PHQ QUESTIONS 1-9: 0
2. FEELING DOWN, DEPRESSED OR HOPELESS: 0
SUM OF ALL RESPONSES TO PHQ QUESTIONS 1-9: 0
SUM OF ALL RESPONSES TO PHQ9 QUESTIONS 1 & 2: 0
1. LITTLE INTEREST OR PLEASURE IN DOING THINGS: 0
SUM OF ALL RESPONSES TO PHQ QUESTIONS 1-9: 0

## 2024-01-15 NOTE — PROGRESS NOTES
Deep Tendon Reflexes: Reflexes are normal and symmetric. Reflexes normal.   Psychiatric:         Mood and Affect: Mood normal.                                 ASSESSMENT/PLAN:    Patient Active Problem List   Diagnosis    Attention deficit disorder (ADD) without hyperactivity    Tic disorder    Seasonal allergies    Deviated nasal septum       Attention deficit disorder (ADD) without hyperactivity  Tic disorder    Continue same meds  Counseled    No orders of the defined types were placed in this encounter.       Return in about 3 months (around 4/15/2024) for Recheck Meds.      I spent 20 minutes with this patient.  I spent greater than 50% of the time counseling this patient.        Oscar Melgar DO  1/15/2024  4:19 PM

## 2024-02-06 ENCOUNTER — TELEPHONE (OUTPATIENT)
Dept: ENT CLINIC | Age: 29
End: 2024-02-06

## 2024-02-06 NOTE — TELEPHONE ENCOUNTER
Pt called on his way to appt. Said his gps took him to the wrong address and he was going to be 20 minutes late. Per MA appt was to be rescheduled due to provider schedule. Pt very unhappy that he had to resschedule and did not understand why he could not be seen. Pt agreed to go to [facility] office. Given address and phone # to that office.   Electronically signed by Serina Ny on 2/6/2024 at 3:00 PM

## 2024-03-14 ENCOUNTER — OFFICE VISIT (OUTPATIENT)
Dept: ENT CLINIC | Age: 29
End: 2024-03-14
Payer: COMMERCIAL

## 2024-03-14 VITALS
HEIGHT: 70 IN | SYSTOLIC BLOOD PRESSURE: 133 MMHG | HEART RATE: 55 BPM | BODY MASS INDEX: 24.77 KG/M2 | WEIGHT: 173 LBS | DIASTOLIC BLOOD PRESSURE: 87 MMHG

## 2024-03-14 DIAGNOSIS — R09.81 NASAL CONGESTION: ICD-10-CM

## 2024-03-14 DIAGNOSIS — J30.9 CHRONIC ALLERGIC RHINITIS: Primary | ICD-10-CM

## 2024-03-14 DIAGNOSIS — J34.2 DEVIATED NASAL SEPTUM: ICD-10-CM

## 2024-03-14 PROCEDURE — 99204 OFFICE O/P NEW MOD 45 MIN: CPT

## 2024-03-14 RX ORDER — AZELASTINE 1 MG/ML
2 SPRAY, METERED NASAL 2 TIMES DAILY
Qty: 30 ML | Refills: 1 | Status: SHIPPED | OUTPATIENT
Start: 2024-03-14

## 2024-03-14 ASSESSMENT — ENCOUNTER SYMPTOMS
EYE DISCHARGE: 0
VOMITING: 0
SINUS PRESSURE: 0
ALLERGIC/IMMUNOLOGIC NEGATIVE: 1
BACK PAIN: 0
DIARRHEA: 0
RHINORRHEA: 1
SHORTNESS OF BREATH: 0
EYE PAIN: 0
COUGH: 0
SORE THROAT: 0

## 2024-03-14 NOTE — PROGRESS NOTES
Behavior normal. Behavior is cooperative.         Thought Content: Thought content normal.         Judgment: Judgment normal.       Assessment:       Diagnosis Orders   1. Chronic allergic rhinitis        2. Deviated nasal septum          Plan:      Nhan is a 28-year-old male who presents the office today as a new patient for evaluation of chronic allergy symptoms.  The patient does report that he suffers from frequent tics and feels that his symptoms are linked closely to his allergy symptoms.  He states that this is allergies get worse his tics also get worse.  He previously underwent a septoplasty approximately 6 years ago however states that he saw no improvement following the procedure.  He is currently working with neurology and allergy for management of symptoms.  Examination did reveal a noted rightward septal deviation.  He was also noted to have swollen pale and enlarged inferior turbinates bilaterally.  Based on his current symptoms I would like him to initiate use of Astelin spray 1 spray to each nostril twice daily.  After 2 weeks of like him to increase the Astelin to 2 sprays to each nostril twice daily for continued symptoms.  He was advised to postpone initiation of therapy until he is evaluated by an allergist.  He may ultimately require CT of the sinuses in the future due to the previous septal surgery and severity of the persistent rightward septal deviation.  The patient did verbalize understanding was in agreement to this plan.  He was instructed to call the office for any new or worsening symptoms prior to his next appointment    Follow up in 6 week(s)      RX given today:  Renato Salvador MSN, FNP-BC  [facility] - [facility]    The information contained in this note has been dictated using drug and medical speech recognition software and may contain errors

## 2024-04-25 ENCOUNTER — TELEPHONE (OUTPATIENT)
Dept: NEUROLOGY | Age: 29
End: 2024-04-25

## 2024-04-25 ENCOUNTER — OFFICE VISIT (OUTPATIENT)
Dept: NEUROLOGY | Age: 29
End: 2024-04-25
Payer: COMMERCIAL

## 2024-04-25 VITALS
OXYGEN SATURATION: 98 % | WEIGHT: 180 LBS | BODY MASS INDEX: 25.83 KG/M2 | DIASTOLIC BLOOD PRESSURE: 66 MMHG | SYSTOLIC BLOOD PRESSURE: 117 MMHG | TEMPERATURE: 98.2 F | HEART RATE: 68 BPM

## 2024-04-25 DIAGNOSIS — F95.8 BEHAVIORAL TIC: Primary | ICD-10-CM

## 2024-04-25 PROCEDURE — 99204 OFFICE O/P NEW MOD 45 MIN: CPT | Performed by: CLINICAL NURSE SPECIALIST

## 2024-04-25 NOTE — PROGRESS NOTES
Nhan Kennedy is a 28 y.o. right handed man    Past Medical History:     Past Medical History:   Diagnosis Date    ADHD (attention deficit hyperactivity disorder) 01/01/2022    Anxiety 01/01/2016    Depression 01/01/2016     Past Surgical History:     Past Surgical History:   Procedure Laterality Date    TONSILLECTOMY       Allergies:     Patient has no known allergies.    Medications:     Prior to Admission medications    Medication Sig Start Date End Date Taking? Authorizing Provider   azelastine (ASTELIN) 0.1 % nasal spray 2 sprays by Nasal route 2 times daily Use in each nostril as directed 3/14/24  Yes Morales Salvador APRN - CNP   atomoxetine (STRATTERA) 25 MG capsule Take 1 capsule by mouth daily 12/4/23 4/25/24 Yes Oscar Melgar DO   albuterol sulfate HFA (PROAIR HFA) 108 (90 Base) MCG/ACT inhaler Inhale 2 puffs into the lungs every 6 hours as needed for Shortness of Breath 2/18/23  Yes Ashly Pacheco PA-C     Social History:     Social History     Tobacco Use    Smoking status: Never     Passive exposure: Never    Smokeless tobacco: Never   Substance Use Topics    Alcohol use: Yes     Comment: Its not weekly    Drug use: Never     Family History:     Family History   Problem Relation Age of Onset    Seizures Mother     Mental Illness Mother     Cancer Maternal Grandfather     Alcohol Abuse Father     Mental Illness Father       History of Present Illness:     Patient states he has suffered from a tick since he was a child.  Him and his family have deduced that it seems to stem from recurrent strep infections and shortly after a strep infection he started developing a tick which has been progressed since he has gotten older.    If he is anxious or upset he has noticed that it does make them worse.  He is hoping to find some type of allergy or sensitivity to a food that seems to be causing them.    He reports that his eyes will squint he will then make a sound and it seems to be disturbing people around him.  He

## 2024-06-03 ENCOUNTER — TELEPHONE (OUTPATIENT)
Dept: ADMINISTRATIVE | Age: 29
End: 2024-06-03

## 2024-06-03 NOTE — TELEPHONE ENCOUNTER
[facility] called, Dr Agnes Alonzo only does testing, not therapy;   Patient will need referred to one of these providers:  Francisco Cartwright  phone [phone]  Lashell Smith   phone [phone]    [facility]   phone [phone]  [facility]  phone [phone]    Advise patient

## 2024-06-03 NOTE — TELEPHONE ENCOUNTER
Spoke with patient and he is going to check in to where to go for therapy. He will reach out to us if they need a referral.

## 2024-06-28 ENCOUNTER — OFFICE VISIT (OUTPATIENT)
Dept: PRIMARY CARE CLINIC | Age: 29
End: 2024-06-28
Payer: COMMERCIAL

## 2024-06-28 VITALS
WEIGHT: 181 LBS | OXYGEN SATURATION: 99 % | HEART RATE: 60 BPM | DIASTOLIC BLOOD PRESSURE: 62 MMHG | HEIGHT: 70 IN | SYSTOLIC BLOOD PRESSURE: 110 MMHG | BODY MASS INDEX: 25.91 KG/M2 | TEMPERATURE: 97.7 F

## 2024-06-28 DIAGNOSIS — F98.8 ATTENTION DEFICIT DISORDER (ADD) WITHOUT HYPERACTIVITY: Primary | ICD-10-CM

## 2024-06-28 DIAGNOSIS — M54.2 NECK PAIN, MUSCULOSKELETAL: ICD-10-CM

## 2024-06-28 DIAGNOSIS — F95.9 TIC DISORDER: ICD-10-CM

## 2024-06-28 PROCEDURE — 99213 OFFICE O/P EST LOW 20 MIN: CPT | Performed by: FAMILY MEDICINE

## 2024-06-28 RX ORDER — GUANFACINE 1 MG/1
1 TABLET, EXTENDED RELEASE ORAL NIGHTLY
Qty: 30 TABLET | Refills: 1 | Status: SHIPPED | OUTPATIENT
Start: 2024-06-28

## 2024-06-28 SDOH — ECONOMIC STABILITY: FOOD INSECURITY: WITHIN THE PAST 12 MONTHS, YOU WORRIED THAT YOUR FOOD WOULD RUN OUT BEFORE YOU GOT MONEY TO BUY MORE.: NEVER TRUE

## 2024-06-28 SDOH — ECONOMIC STABILITY: FOOD INSECURITY: WITHIN THE PAST 12 MONTHS, THE FOOD YOU BOUGHT JUST DIDN'T LAST AND YOU DIDN'T HAVE MONEY TO GET MORE.: NEVER TRUE

## 2024-06-28 SDOH — ECONOMIC STABILITY: INCOME INSECURITY: HOW HARD IS IT FOR YOU TO PAY FOR THE VERY BASICS LIKE FOOD, HOUSING, MEDICAL CARE, AND HEATING?: NOT HARD AT ALL

## 2024-06-28 ASSESSMENT — ENCOUNTER SYMPTOMS
EYE PAIN: 0
SWOLLEN GLANDS: 0
DIARRHEA: 0
APNEA: 0
EYE REDNESS: 0
VISUAL CHANGE: 0
VOMITING: 0
WHEEZING: 0
SORE THROAT: 0
COLOR CHANGE: 0
BLOOD IN STOOL: 0
CONSTIPATION: 0
BACK PAIN: 0
BOWEL INCONTINENCE: 0
SINUS PRESSURE: 0
COUGH: 0
NAUSEA: 0
CHEST TIGHTNESS: 0
ABDOMINAL PAIN: 0
RHINORRHEA: 0
SHORTNESS OF BREATH: 0
EYE ITCHING: 0

## 2024-07-25 ENCOUNTER — OFFICE VISIT (OUTPATIENT)
Dept: CHIROPRACTIC MEDICINE | Age: 29
End: 2024-07-25
Payer: COMMERCIAL

## 2024-07-25 VITALS — HEART RATE: 53 BPM | OXYGEN SATURATION: 98 %

## 2024-07-25 DIAGNOSIS — M79.18 MYOFASCIAL PAIN SYNDROME OF THORACIC SPINE: ICD-10-CM

## 2024-07-25 DIAGNOSIS — M54.2 CERVICALGIA: Primary | ICD-10-CM

## 2024-07-25 PROCEDURE — 98940 CHIROPRACT MANJ 1-2 REGIONS: CPT | Performed by: CHIROPRACTOR

## 2024-07-25 PROCEDURE — 97014 ELECTRIC STIMULATION THERAPY: CPT | Performed by: CHIROPRACTOR

## 2024-07-25 PROCEDURE — 99203 OFFICE O/P NEW LOW 30 MIN: CPT | Performed by: CHIROPRACTOR

## 2024-07-25 ASSESSMENT — ENCOUNTER SYMPTOMS
SHORTNESS OF BREATH: 0
COUGH: 0

## 2024-07-25 NOTE — PROGRESS NOTES
[facility]    26  Nhan Kennedy : 1995 Sex: male  Age: 28 y.o.    Patient was referred by Oscar Melgar DO    Chief Complaint   Patient presents with    Neck Pain       Few year of neck pain  Started after doing pushups in certain position  Boxes.  When he throws \"ones\" he notices it.    Also in certain postures or holding arms up, doing dumbbell curls too  No UE symptoms    Hasn't stopped him from working out     Prior chiro care but years ago.       Neck Pain   This is a chronic problem. The current episode started more than 1 year ago. The problem occurs intermittently. The pain is associated with a remote injury. The pain is present in the left side and right side (CT region). The pain is at a severity of 1/10 (up to 7-8/10). Pertinent negatives include no fever, numbness, tingling or weakness. He has tried home exercises for the symptoms. The treatment provided mild relief.       Red Flags:  none    Review of Systems   Constitutional:  Negative for chills and fever.   Respiratory:  Negative for cough and shortness of breath.    Musculoskeletal:  Positive for myalgias, neck pain and neck stiffness.   Neurological:  Negative for tingling, weakness and numbness.         Current Outpatient Medications:     guanFACINE (INTUNIV) 1 MG TB24 extended release tablet, Take 1 tablet by mouth nightly, Disp: 30 tablet, Rfl: 1    azelastine (ASTELIN) 0.1 % nasal spray, 2 sprays by Nasal route 2 times daily Use in each nostril as directed, Disp: 30 mL, Rfl: 1    albuterol sulfate HFA (PROAIR HFA) 108 (90 Base) MCG/ACT inhaler, Inhale 2 puffs into the lungs every 6 hours as needed for Shortness of Breath, Disp: 18 g, Rfl: 0    No Known Allergies    Past Medical History:   Diagnosis Date    ADHD (attention deficit hyperactivity disorder) 2022    Anxiety 2016    Depression 2016     Family History   Problem Relation Age of Onset    Seizures Mother     Mental Illness Mother     Cancer  No

## 2024-08-09 ENCOUNTER — OFFICE VISIT (OUTPATIENT)
Dept: PRIMARY CARE CLINIC | Age: 29
End: 2024-08-09
Payer: COMMERCIAL

## 2024-08-09 VITALS
OXYGEN SATURATION: 100 % | HEART RATE: 59 BPM | WEIGHT: 182 LBS | DIASTOLIC BLOOD PRESSURE: 80 MMHG | TEMPERATURE: 97.4 F | BODY MASS INDEX: 26.05 KG/M2 | SYSTOLIC BLOOD PRESSURE: 126 MMHG | RESPIRATION RATE: 18 BRPM | HEIGHT: 70 IN

## 2024-08-09 DIAGNOSIS — F98.8 ATTENTION DEFICIT DISORDER (ADD) WITHOUT HYPERACTIVITY: Primary | ICD-10-CM

## 2024-08-09 DIAGNOSIS — F95.9 TIC DISORDER: ICD-10-CM

## 2024-08-09 PROCEDURE — 99213 OFFICE O/P EST LOW 20 MIN: CPT | Performed by: FAMILY MEDICINE

## 2024-08-09 ASSESSMENT — ENCOUNTER SYMPTOMS
VOMITING: 0
COUGH: 0
ABDOMINAL PAIN: 0
SHORTNESS OF BREATH: 0
CHEST TIGHTNESS: 0
WHEEZING: 0
DIARRHEA: 0
RHINORRHEA: 0
CONSTIPATION: 0
VISUAL CHANGE: 0
BOWEL INCONTINENCE: 0
SORE THROAT: 0
SINUS PRESSURE: 0
COLOR CHANGE: 0
EYE ITCHING: 0
EYE REDNESS: 0
SWOLLEN GLANDS: 0
NAUSEA: 0
BACK PAIN: 0
APNEA: 0
EYE PAIN: 0
BLOOD IN STOOL: 0

## 2024-08-09 NOTE — PROGRESS NOTES
Chief Complaint:     Chief Complaint   Patient presents with    Medication Check     6 week follow up         Neurologic Problem  The patient's pertinent negatives include no clumsiness, focal sensory loss, focal weakness, memory loss, near-syncope, slurred speech, visual change or weakness. Primary symptoms comment: Tic disorder. The current episode started more than 1 year ago. The neurological problem developed gradually. The problem has been waxing and waning since onset. There was no focality noted. Pertinent negatives include no abdominal pain, aura, back pain, bladder incontinence, bowel incontinence, chest pain, dizziness, fatigue, fever, headaches, light-headedness, nausea, neck pain, palpitations, shortness of breath, vertigo or vomiting. Past treatments include nothing. The treatment provided no relief.   Other  This is a chronic (ADD) problem. The current episode started more than 1 year ago. The problem occurs daily. The problem has been unchanged. Associated symptoms include congestion. Pertinent negatives include no abdominal pain, arthralgias, chest pain, coughing, fatigue, fever, headaches, myalgias, nausea, neck pain, numbness, rash, sore throat, swollen glands, vertigo, visual change, vomiting or weakness. Nothing aggravates the symptoms. He has tried nothing for the symptoms. The treatment provided no relief.       Patient Active Problem List   Diagnosis    Attention deficit disorder (ADD) without hyperactivity    Tic disorder    Seasonal allergies    Deviated nasal septum       Past Medical History:   Diagnosis Date    ADHD (attention deficit hyperactivity disorder) 01/01/2022    Anxiety 01/01/2016    Depression 01/01/2016       Past Surgical History:   Procedure Laterality Date    TONSILLECTOMY         Current Outpatient Medications   Medication Sig Dispense Refill    guanFACINE (INTUNIV) 1 MG TB24 extended release tablet Take 1 tablet by mouth nightly 30 tablet 1    azelastine (ASTELIN) 0.1 %

## 2024-08-19 ENCOUNTER — TELEPHONE (OUTPATIENT)
Dept: PRIMARY CARE CLINIC | Age: 29
End: 2024-08-19